# Patient Record
Sex: FEMALE | Race: BLACK OR AFRICAN AMERICAN | Employment: FULL TIME | URBAN - METROPOLITAN AREA
[De-identification: names, ages, dates, MRNs, and addresses within clinical notes are randomized per-mention and may not be internally consistent; named-entity substitution may affect disease eponyms.]

---

## 2017-03-14 DIAGNOSIS — E03.9 ACQUIRED HYPOTHYROIDISM: ICD-10-CM

## 2017-03-14 NOTE — TELEPHONE ENCOUNTER
From: Artemio Reno  To: Linda Douglas NP  Sent: 3/14/2017 3:09 PM EDT  Subject: Medication Renewal Request    Original authorizing provider: MADYSON Ellis would like a refill of the following medications:  levothyroxine (SYNTHROID) 88 mcg tablet Linda Dogulas NP]    Preferred pharmacy: [facility]    Comment:  Good afternoon, I was wanting to know how do I go about getting a refill on my Synthroid, also will I have make an appointment for lab work to be done to make sure I am getting the correct dosage. My cell number is [phone] thank you for your time have a great day.

## 2017-03-15 RX ORDER — LEVOTHYROXINE SODIUM 88 UG/1
88 TABLET ORAL
Qty: 30 TAB | Refills: 1 | Status: SHIPPED | OUTPATIENT
Start: 2017-03-15 | End: 2017-03-27 | Stop reason: DRUGHIGH

## 2017-03-27 ENCOUNTER — OFFICE VISIT (OUTPATIENT)
Dept: FAMILY MEDICINE CLINIC | Age: 32
End: 2017-03-27

## 2017-03-27 ENCOUNTER — HOSPITAL ENCOUNTER (OUTPATIENT)
Dept: LAB | Age: 32
Discharge: HOME | End: 2017-03-27
Payer: COMMERCIAL

## 2017-03-27 VITALS
HEART RATE: 81 BPM | OXYGEN SATURATION: 98 % | DIASTOLIC BLOOD PRESSURE: 82 MMHG | TEMPERATURE: 98.2 F | SYSTOLIC BLOOD PRESSURE: 110 MMHG | HEIGHT: 67 IN | BODY MASS INDEX: 37.86 KG/M2 | WEIGHT: 241.2 LBS | RESPIRATION RATE: 16 BRPM

## 2017-03-27 DIAGNOSIS — E03.9 ACQUIRED HYPOTHYROIDISM: Primary | ICD-10-CM

## 2017-03-27 DIAGNOSIS — E03.9 ACQUIRED HYPOTHYROIDISM: ICD-10-CM

## 2017-03-27 DIAGNOSIS — J01.00 ACUTE NON-RECURRENT MAXILLARY SINUSITIS: ICD-10-CM

## 2017-03-27 DIAGNOSIS — J30.9 ALLERGIC RHINITIS, UNSPECIFIED ALLERGIC RHINITIS TRIGGER, UNSPECIFIED RHINITIS SEASONALITY: ICD-10-CM

## 2017-03-27 LAB
T4 FREE SERPL-MCNC: 0.8 NG/DL (ref 0.7–1.5)
TSH SERPL DL<=0.05 MIU/L-ACNC: 7.44 UIU/ML (ref 0.36–3.74)

## 2017-03-27 PROCEDURE — 84439 ASSAY OF FREE THYROXINE: CPT | Performed by: NURSE PRACTITIONER

## 2017-03-27 PROCEDURE — 84443 ASSAY THYROID STIM HORMONE: CPT | Performed by: NURSE PRACTITIONER

## 2017-03-27 RX ORDER — LEVOTHYROXINE SODIUM 100 UG/1
100 TABLET ORAL
Qty: 30 TAB | Refills: 4 | Status: SHIPPED | OUTPATIENT
Start: 2017-03-27

## 2017-03-27 RX ORDER — LEVOTHYROXINE SODIUM 88 UG/1
88 TABLET ORAL
Qty: 30 TAB | Refills: 1 | Status: CANCELLED | OUTPATIENT
Start: 2017-03-27

## 2017-03-27 RX ORDER — LORATADINE 10 MG/1
10 TABLET ORAL
Qty: 30 TAB | Refills: 3 | Status: SHIPPED | OUTPATIENT
Start: 2017-03-27

## 2017-03-27 RX ORDER — BENZOCAINE .13; .15; .5; 2 G/100G; G/100G; G/100G; G/100G
2 GEL ORAL
Qty: 1 BOTTLE | Refills: 2 | Status: SHIPPED | OUTPATIENT
Start: 2017-03-27

## 2017-03-27 RX ORDER — AZITHROMYCIN 250 MG/1
TABLET, FILM COATED ORAL
Qty: 6 TAB | Refills: 0 | Status: SHIPPED | OUTPATIENT
Start: 2017-03-27 | End: 2017-04-01

## 2017-03-27 NOTE — PROGRESS NOTES
1. Have you been to the ER, urgent care clinic since your last visit? Hospitalized since your last visit? Yes, [facility] 3/23/2017 URI    2. Have you seen or consulted any other health care providers outside of the [address] [place] [address] since your last visit? Include any pap smears or colon screening. No    3. Would you like to have a Flu Shot Today? Already had    4. Vaccines?  tdap    Rx updated

## 2017-03-27 NOTE — PATIENT INSTRUCTIONS
Allergies: Care Instructions  Your Care Instructions  Allergies occur when your body's defense system (immune system) overreacts to certain substances. The immune system treats a harmless substance as if it were a harmful germ or virus. Many things can cause this overreaction, including pollens, medicine, food, dust, animal dander, and mold. Allergies can be mild or severe. Mild allergies can be managed with home treatment. But medicine may be needed to prevent problems. Managing your allergies is an important part of staying healthy. Your doctor may suggest that you have allergy testing to help find out what is causing your allergies. When you know what things trigger your symptoms, you can avoid them. This can prevent allergy symptoms and other health problems. For severe allergies that cause reactions that affect your whole body (anaphylactic reactions), your doctor may prescribe a shot of epinephrine to carry with you in case you have a severe reaction. Learn how to give yourself the shot and keep it with you at all times. Make sure it is not . Follow-up care is a key part of your treatment and safety. Be sure to make and go to all appointments, and call your doctor if you are having problems. It's also a good idea to know your test results and keep a list of the medicines you take. How can you care for yourself at home? · If you have been told by your doctor that dust or dust mites are causing your allergy, decrease the dust around your bed:  [facility] sheets, pillowcases, and other bedding in hot water every week. ¨ Use dust-proof covers for pillows, duvets, and mattresses. Avoid plastic covers because they tear easily and do not \"breathe. \" Wash as instructed on the label. ¨ Do not use any blankets and pillows that you do not need. ¨ Use blankets that you can wash in your washing machine. ¨ Consider removing drapes and carpets, which attract and hold dust, from your bedroom.   · If you are allergic to house dust and mites, do not use home humidifiers. Your doctor can suggest ways you can control dust and mites. · Look for signs of cockroaches. Cockroaches cause allergic reactions. Use cockroach baits to get rid of them. Then, clean your home well. Cockroaches like areas where grocery bags, newspapers, empty bottles, or cardboard boxes are stored. Do not keep these inside your home, and keep trash and food containers sealed. Seal off any spots where cockroaches might enter your home. · If you are allergic to mold, get rid of furniture, rugs, and drapes that smell musty. Check for mold in the bathroom. · If you are allergic to outdoor pollen or mold spores, use air-conditioning. Change or clean all filters every month. Keep windows closed. · If you are allergic to pollen, stay inside when pollen counts are high. Use a vacuum  with a HEPA filter or a double-thickness filter at least two times each week. · Stay inside when air pollution is bad. Avoid paint fumes, perfumes, and other strong odors. · Avoid conditions that make your allergies worse. Stay away from smoke. Do not smoke or let anyone else smoke in your house. Do not use fireplaces or wood-burning stoves. · If you are allergic to your pets, change the air filter in your furnace every month. Use high-efficiency filters. · If you are allergic to pet dander, keep pets outside or out of your bedroom. Old carpet and cloth furniture can hold a lot of animal dander. You may need to replace them. When should you call for help? Give an epinephrine shot if:  · You think you are having a severe allergic reaction. · You have symptoms in more than one body area, such as mild nausea and an itchy mouth. After giving an epinephrine shot call 911, even if you feel better. Call 911 if:  · You have symptoms of a severe allergic reaction. These may include:  ¨ Sudden raised, red areas (hives) all over your body.   ¨ Swelling of the throat, mouth, lips, or tongue. ¨ Trouble breathing. ¨ Passing out (losing consciousness). Or you may feel very lightheaded or suddenly feel weak, confused, or restless. · You have been given an epinephrine shot, even if you feel better. Call your doctor now or seek immediate medical care if:  · You have symptoms of an allergic reaction, such as:  ¨ A rash or hives (raised, red areas on the skin). ¨ Itching. ¨ Swelling. ¨ Belly pain, nausea, or vomiting. Watch closely for changes in your health, and be sure to contact your doctor if:  · You do not get better as expected. Where can you learn more? Go to http://liana-bladimir.info/. Enter A059 in the search box to learn more about \"Allergies: Care Instructions. \"  Current as of: February 12, 2016  Content Version: 11.1  © 7545-7094 [employer]. Care instructions adapted under license by [employer] (which disclaims liability or warranty for this information). If you have questions about a medical condition or this instruction, always ask your healthcare professional. [place] [address] any warranty or liability for your use of this information. Using a Nasal Steroid Spray: Care Instructions  Your Care Instructions    Your doctor may suggest using a corticosteroid nasal spray for your allergy symptoms or sinus problems. These sprays reduce the swelling inside the nose and sinuses. Unlike decongestant nasal sprays, steroid sprays won't lead to more swelling when you stop taking them. These sprays start working in a few days, but it may take several weeks before you get the full effect. Most side effects are minor. The most common complaint is a burning feeling in the nose right after the spray is used. Some people get nosebleeds. Follow-up care is a key part of your treatment and safety. Be sure to make and go to all appointments, and call your doctor if you are having problems.  It's also a good idea to know your test results and keep a list of the medicines you take. How can you care for yourself at home? Here are some tips for using these sprays:  · You may need to prime the sprayer before you use it. This means spraying it into the air a few times to make sure you get the right amount of medicine. Follow the directions on the label. · Blow your nose before you spray. This will help clear out your nostrils. · Gently sniff the medicine into your nose as you spray. Don't snort, or the medicine will go all the way into your throat where it won't do much good. · Aim the nozzle straight toward the back of your nose. This will help keep the medicine from irritating the inside walls of your nostril, especially your septum (the wall that separates your left and right nostrils). · Don't blow your nose for 10 minutes or so after you spray. And try not to sneeze. · Be safe with medicines. Use this medicine exactly as prescribed. Call your doctor if you think you are having a problem with your medicine. · Clean your sprayer once a week. Read the label to learn how. When should you call for help? Call your doctor now or seek immediate medical care if:  · You don't understand how to use the medicine. · Your symptoms aren't getting better as expected. · You think you are having a side effect from the medicine. Where can you learn more? Go to http://liana-bladimir.info/. Enter C102 in the search box to learn more about \"Using a Nasal Steroid Spray: Care Instructions. \"  Current as of: July 29, 2016  Content Version: 11.1  © 2036-8093 [employer]. Care instructions adapted under license by [employer] (which disclaims liability or warranty for this information). If you have questions about a medical condition or this instruction, always ask your healthcare professional. [address] any warranty or liability for your use of this information.        Sinusitis: Care Instructions  Your Care Instructions    Sinusitis is an infection of the lining of the sinus cavities in your head. Sinusitis often follows a cold. It causes pain and pressure in your head and face. In most cases, sinusitis gets better on its own in 1 to 2 weeks. But some mild symptoms may last for several weeks. Sometimes antibiotics are needed. Follow-up care is a key part of your treatment and safety. Be sure to make and go to all appointments, and call your doctor if you are having problems. It's also a good idea to know your test results and keep a list of the medicines you take. How can you care for yourself at home? · Take an over-the-counter pain medicine, such as acetaminophen (Tylenol), ibuprofen (Advil, Motrin), or naproxen (Aleve). Read and follow all instructions on the label. · If the doctor prescribed antibiotics, take them as directed. Do not stop taking them just because you feel better. You need to take the full course of antibiotics. · Be careful when taking over-the-counter cold or flu medicines and Tylenol at the same time. Many of these medicines have acetaminophen, which is Tylenol. Read the labels to make sure that you are not taking more than the recommended dose. Too much acetaminophen (Tylenol) can be harmful. · Breathe warm, moist air from a steamy shower, a hot bath, or a sink filled with hot water. Avoid cold, dry air. Using a humidifier in your home may help. Follow the directions for cleaning the machine. · Use saline (saltwater) nasal washes to help keep your nasal passages open and wash out mucus and bacteria. You can buy saline nose drops at a grocery store or drugstore. Or you can make your own at home by adding 1 teaspoon of salt and 1 teaspoon of baking soda to 2 cups of distilled water. If you make your own, fill a bulb syringe with the solution, insert the tip into your nostril, and squeeze gently. Manjit Sabrina your nose.   · Put a hot, wet towel or a warm gel pack on your face 3 or 4 times a day for 5 to 10 minutes each time. · Try a decongestant nasal spray like oxymetazoline (Afrin). Do not use it for more than 3 days in a row. Using it for more than 3 days can make your congestion worse. When should you call for help? Call your doctor now or seek immediate medical care if:  · You have new or worse swelling or redness in your face or around your eyes. · You have a new or higher fever. Watch closely for changes in your health, and be sure to contact your doctor if:  · You have new or worse facial pain. · The mucus from your nose becomes thicker (like pus) or has new blood in it. · You are not getting better as expected. Where can you learn more? Go to http://liana-bladimir.info/. Enter H158 in the search box to learn more about \"Sinusitis: Care Instructions. \"  Current as of: July 29, 2016  Content Version: 11.1  © 6087-8349 [employer]. Care instructions adapted under license by [employer] (which disclaims liability or warranty for this information). If you have questions about a medical condition or this instruction, always ask your healthcare professional. [place] [address] any warranty or liability for your use of this information. Shingles: Care Instructions  Your Care Instructions    Shingles (herpes zoster) causes pain and a blistered rash. The rash can appear anywhere on the body but will be on only one side of the body, the left or right. It will be in a band, a strip, or a small area. The pain can be very severe. Shingles can also cause tingling or itching in the area of the rash. The blisters scab over after a few days and heal in 2 to 4 weeks. Medicines can help you feel better and may help prevent more serious problems caused by shingles. Shingles is caused by the same virus that causes chickenpox.  When you have chickenpox, the virus gets into your nerve roots and stays there (becomes dormant) long after you get over the chickenpox. If the virus becomes active again, it can cause shingles. Follow-up care is a key part of your treatment and safety. Be sure to make and go to all appointments, and call your doctor if you are having problems. It's also a good idea to know your test results and keep a list of the medicines you take. How can you care for yourself at home? · Be safe with medicines. Take your medicines exactly as prescribed. Call your doctor if you think you are having a problem with your medicine. Antiviral medicine helps you get better faster. · Try not to scratch or pick at the blisters. They will crust over and fall off on their own if you leave them alone. · Put cool, wet cloths on the area to relieve pain and itching. You can also use calamine lotion. Try not to use so much lotion that it cakes and is hard to get off. · Put cornstarch or baking soda on the sores to help dry them out so they heal faster. · Do not use thick ointment, such as petroleum jelly, on the sores. This will keep them from drying and healing. · To help remove loose crusts, soak them in tap water. This can help decrease oozing, and dry and soothe the skin. · Take an over-the-counter pain medicine, such as acetaminophen (Tylenol), ibuprofen (Advil, Motrin), or naproxen (Aleve). Read and follow all instructions on the label. · Avoid close contact with people until the blisters have healed. It is very important for you to avoid contact with anyone who has never had chickenpox or the chickenpox vaccine. Pregnant women, young babies, and anyone else who has a hard time fighting infection (such as someone with HIV, diabetes, or cancer) is especially at risk. When should you call for help? Call your doctor now or seek immediate medical care if:  · You have a new or higher fever. · You have a severe headache and a stiff neck. · You lose the ability to think clearly.   · The rash spreads to your forehead, nose, eyes, or eyelids. · You have eye pain, or your vision gets worse. · You have new pain in your face, or you cannot move the muscles in your face. · Blisters spread to new parts of your body. Watch closely for changes in your health, and be sure to contact your doctor if:  · The rash has not healed after 2 to 4 weeks. · You still have pain after the rash has healed. Where can you learn more? Go to http://liana-bladimir.info/. Rebecca Parra in the search box to learn more about \"Shingles: Care Instructions. \"  Current as of: May 24, 2016  Content Version: 11.1  © 5717-5864 [employer].. Care instructions adapted under license by [employer] (which disclaims liability or warranty for this information). If you have questions about a medical condition or this instruction, always ask your healthcare professional. [address] any warranty or liability for your use of this information.

## 2017-03-27 NOTE — MR AVS SNAPSHOT
Visit Information Date & Time Provider Department Dept. Phone Encounter #  
 3/27/2017  7:00 AM Kalpesh Iverson, [address]  [id number] Follow-up Instructions Return in about 3 months (around 6/27/2017), or if symptoms worsen or fail to improve, for follow up thyroid and BMI. Upcoming Health Maintenance Date Due DTaP/Tdap/Td series (1 - Tdap) 6/8/2006 PAP AKA CERVICAL CYTOLOGY 11/14/2019 Allergies as of 3/27/2017  Review Complete On: 3/27/2017 By: Kalpesh Iverson NP No Known Allergies Current Immunizations  Reviewed on 11/14/2016 Name Date Influenza Vaccine 9/7/2016 Not reviewed this visit You Were Diagnosed With   
  
 Codes Comments Acquired hypothyroidism    -  Primary ICD-10-CM: E03.9 ICD-9-CM: 244.9 Acute non-recurrent maxillary sinusitis     ICD-10-CM: J01.00 ICD-9-CM: 461.0 Allergic rhinitis, unspecified allergic rhinitis trigger, unspecified rhinitis seasonality     ICD-10-CM: J30.9 ICD-9-CM: 477.9 Vitals BP Pulse Temp Resp Height(growth percentile) Weight(growth percentile) 110/82 (BP 1 Location: Left arm, BP Patient Position: Sitting) 81 98.2 °F (36.8 °C) (Oral) 16 5' 7\" (1.702 m) 241 lb 3.2 oz (109.4 kg) LMP SpO2 BMI OB Status Smoking Status 03/25/2017 (Exact Date) 98% 37.78 kg/m2 Having regular periods Never Smoker BMI and BSA Data Body Mass Index Body Surface Area 37.78 kg/m 2 2.27 m 2 Preferred Pharmacy Pharmacy Name Phone [facility] [address], [address] Your Updated Medication List  
  
   
This list is accurate as of: 3/27/17  7:45 AM.  Always use your most recent med list.  
  
  
  
  
 azithromycin 250 mg tablet Commonly known as:  [place] Take 2 tablets today, then take 1 tablet daily  
  
 budesonide 32 mcg/actuation nasal spray Commonly known as:  RHINOCORT AQUA  
2 Sprays by Both Nostrils route daily as needed for Rhinitis or Allergies. ferrous sulfate 325 mg (65 mg iron) EC tablet Commonly known as:  IRON Take 1 Tab by mouth two (2) times a day. ibuprofen 800 mg tablet Commonly known as:  MOTRIN Take 1 Tab by mouth every eight (8) hours as needed for Pain.  
  
 levothyroxine 100 mcg tablet Commonly known as:  SYNTHROID Take 1 Tab by mouth Daily (before breakfast). Indications: hypothyroidism  
  
 loratadine 10 mg tablet Commonly known as:  Robbin Seal Take 1 Tab by mouth daily as needed for Allergies. Prescriptions Sent to Pharmacy Refills  
 levothyroxine (SYNTHROID) 100 mcg tablet 4 Sig: Take 1 Tab by mouth Daily (before breakfast). Indications: hypothyroidism Class: Normal  
 Pharmacy: [address] [place] [address] [address] [place] [address], [address] [place] [address] Ph #: [phone] Route: Oral  
 azithromycin (ZITHROMAX) 250 mg tablet 0 Sig: Take 2 tablets today, then take 1 tablet daily Class: Normal  
 Pharmacy: [facility] [address] [place] [address] Ph #: [phone]  
 budesonide (RHINOCORT AQUA) 32 mcg/actuation nasal spray 2 Si Sprays by Both Nostrils route daily as needed for Rhinitis or Allergies. Class: Normal  
 Pharmacy: [address] [place] [address] [address] [place] [address], [address] [place] [address] Ph #: [phone] Route: Both Nostrils  
 loratadine (CLARITIN) 10 mg tablet 3 Sig: Take 1 Tab by mouth daily as needed for Allergies. Class: Normal  
 Pharmacy: [address] [place] [address]. [address] [place] [address], [address] [place] [address] Ph #: [phone] Route: Oral  
  
Follow-up Instructions Return in about 3 months (around 2017), or if symptoms worsen or fail to improve, for follow up thyroid and BMI. To-Do List   
 2017 Lab:  T4, FREE   
  
 2017   Lab:  TSH 3RD GENERATION   
  
  
 Patient Instructions Allergies: Care Instructions Your Care Instructions Allergies occur when your body's defense system (immune system) overreacts to certain substances. The immune system treats a harmless substance as if it were a harmful germ or virus. Many things can cause this overreaction, including pollens, medicine, food, dust, animal dander, and mold. Allergies can be mild or severe. Mild allergies can be managed with home treatment. But medicine may be needed to prevent problems. Managing your allergies is an important part of staying healthy. Your doctor may suggest that you have allergy testing to help find out what is causing your allergies. When you know what things trigger your symptoms, you can avoid them. This can prevent allergy symptoms and other health problems. For severe allergies that cause reactions that affect your whole body (anaphylactic reactions), your doctor may prescribe a shot of epinephrine to carry with you in case you have a severe reaction. Learn how to give yourself the shot and keep it with you at all times. Make sure it is not . Follow-up care is a key part of your treatment and safety. Be sure to make and go to all appointments, and call your doctor if you are having problems. It's also a good idea to know your test results and keep a list of the medicines you take. How can you care for yourself at home? · If you have been told by your doctor that dust or dust mites are causing your allergy, decrease the dust around your bed: 
[facility] AUTHORITY sheets, pillowcases, and other bedding in hot water every week. ¨ Use dust-proof covers for pillows, duvets, and mattresses. Avoid plastic covers because they tear easily and do not \"breathe. \" Wash as instructed on the label. ¨ Do not use any blankets and pillows that you do not need. ¨ Use blankets that you can wash in your washing machine.  
¨ Consider removing drapes and carpets, which attract and hold dust, from your bedroom. · If you are allergic to house dust and mites, do not use home humidifiers. Your doctor can suggest ways you can control dust and mites. · Look for signs of cockroaches. Cockroaches cause allergic reactions. Use cockroach baits to get rid of them. Then, clean your home well. Cockroaches like areas where grocery bags, newspapers, empty bottles, or cardboard boxes are stored. Do not keep these inside your home, and keep trash and food containers sealed. Seal off any spots where cockroaches might enter your home. · If you are allergic to mold, get rid of furniture, rugs, and drapes that smell musty. Check for mold in the bathroom. · If you are allergic to outdoor pollen or mold spores, use air-conditioning. Change or clean all filters every month. Keep windows closed. · If you are allergic to pollen, stay inside when pollen counts are high. Use a vacuum  with a HEPA filter or a double-thickness filter at least two times each week. · Stay inside when air pollution is bad. Avoid paint fumes, perfumes, and other strong odors. · Avoid conditions that make your allergies worse. Stay away from smoke. Do not smoke or let anyone else smoke in your house. Do not use fireplaces or wood-burning stoves. · If you are allergic to your pets, change the air filter in your furnace every month. Use high-efficiency filters. · If you are allergic to pet dander, keep pets outside or out of your bedroom. Old carpet and cloth furniture can hold a lot of animal dander. You may need to replace them. When should you call for help? Give an epinephrine shot if: 
· You think you are having a severe allergic reaction. · You have symptoms in more than one body area, such as mild nausea and an itchy mouth. After giving an epinephrine shot call 911, even if you feel better. Call 911 if: 
· You have symptoms of a severe allergic reaction. These may include: 
¨ Sudden raised, red areas (hives) all over your body. ¨ Swelling of the throat, mouth, lips, or tongue. ¨ Trouble breathing. ¨ Passing out (losing consciousness). Or you may feel very lightheaded or suddenly feel weak, confused, or restless. · You have been given an epinephrine shot, even if you feel better. Call your doctor now or seek immediate medical care if: 
· You have symptoms of an allergic reaction, such as: ¨ A rash or hives (raised, red areas on the skin). ¨ Itching. ¨ Swelling. ¨ Belly pain, nausea, or vomiting. Watch closely for changes in your health, and be sure to contact your doctor if: 
· You do not get better as expected. Where can you learn more? Go to http://liana-bladimir.info/. Enter M438 in the search box to learn more about \"Allergies: Care Instructions. \" Current as of: February 12, 2016 Content Version: 11.1 © 2006-2016 [employer]. Care instructions adapted under license by [employer] (which disclaims liability or warranty for this information). If you have questions about a medical condition or this instruction, always ask your healthcare professional. [place] [address] any warranty or liability for your use of this information. Using a Nasal Steroid Spray: Care Instructions Your Care Instructions Your doctor may suggest using a corticosteroid nasal spray for your allergy symptoms or sinus problems. These sprays reduce the swelling inside the nose and sinuses. Unlike decongestant nasal sprays, steroid sprays won't lead to more swelling when you stop taking them. These sprays start working in a few days, but it may take several weeks before you get the full effect. Most side effects are minor. The most common complaint is a burning feeling in the nose right after the spray is used. Some people get nosebleeds. Follow-up care is a key part of your treatment and safety.  Be sure to make and go to all appointments, and call your doctor if you are having problems. It's also a good idea to know your test results and keep a list of the medicines you take. How can you care for yourself at home? Here are some tips for using these sprays: 
· You may need to prime the sprayer before you use it. This means spraying it into the air a few times to make sure you get the right amount of medicine. Follow the directions on the label. · Blow your nose before you spray. This will help clear out your nostrils. · Gently sniff the medicine into your nose as you spray. Don't snort, or the medicine will go all the way into your throat where it won't do much good. · Aim the nozzle straight toward the back of your nose. This will help keep the medicine from irritating the inside walls of your nostril, especially your septum (the wall that separates your left and right nostrils). · Don't blow your nose for 10 minutes or so after you spray. And try not to sneeze. · Be safe with medicines. Use this medicine exactly as prescribed. Call your doctor if you think you are having a problem with your medicine. · Clean your sprayer once a week. Read the label to learn how. When should you call for help? Call your doctor now or seek immediate medical care if: 
· You don't understand how to use the medicine. · Your symptoms aren't getting better as expected. · You think you are having a side effect from the medicine. Where can you learn more? Go to http://liana-bladimir.info/. Enter M651 in the search box to learn more about \"Using a Nasal Steroid Spray: Care Instructions. \" Current as of: July 29, 2016 Content Version: 11.1 © 1338-4846 [employer]. Care instructions adapted under license by [employer] (which disclaims liability or warranty for this information).  If you have questions about a medical condition or this instruction, always ask your healthcare professional. [employer] disclaims any warranty or liability for your use of this information. Sinusitis: Care Instructions Your Care Instructions Sinusitis is an infection of the lining of the sinus cavities in your head. Sinusitis often follows a cold. It causes pain and pressure in your head and face. In most cases, sinusitis gets better on its own in 1 to 2 weeks. But some mild symptoms may last for several weeks. Sometimes antibiotics are needed. Follow-up care is a key part of your treatment and safety. Be sure to make and go to all appointments, and call your doctor if you are having problems. It's also a good idea to know your test results and keep a list of the medicines you take. How can you care for yourself at home? · Take an over-the-counter pain medicine, such as acetaminophen (Tylenol), ibuprofen (Advil, Motrin), or naproxen (Aleve). Read and follow all instructions on the label. · If the doctor prescribed antibiotics, take them as directed. Do not stop taking them just because you feel better. You need to take the full course of antibiotics. · Be careful when taking over-the-counter cold or flu medicines and Tylenol at the same time. Many of these medicines have acetaminophen, which is Tylenol. Read the labels to make sure that you are not taking more than the recommended dose. Too much acetaminophen (Tylenol) can be harmful. · Breathe warm, moist air from a steamy shower, a hot bath, or a sink filled with hot water. Avoid cold, dry air. Using a humidifier in your home may help. Follow the directions for cleaning the machine. · Use saline (saltwater) nasal washes to help keep your nasal passages open and wash out mucus and bacteria. You can buy saline nose drops at a grocery store or drugstore. Or you can make your own at home by adding 1 teaspoon of salt and 1 teaspoon of baking soda to 2 cups of distilled water.  If you make your own, fill a bulb syringe with the solution, insert the tip into your nostril, and squeeze gently. Silas Lien your nose. · Put a hot, wet towel or a warm gel pack on your face 3 or 4 times a day for 5 to 10 minutes each time. · Try a decongestant nasal spray like oxymetazoline (Afrin). Do not use it for more than 3 days in a row. Using it for more than 3 days can make your congestion worse. When should you call for help? Call your doctor now or seek immediate medical care if: 
· You have new or worse swelling or redness in your face or around your eyes. · You have a new or higher fever. Watch closely for changes in your health, and be sure to contact your doctor if: 
· You have new or worse facial pain. · The mucus from your nose becomes thicker (like pus) or has new blood in it. · You are not getting better as expected. Where can you learn more? Go to http://liana-bladimir.info/. Enter L691 in the search box to learn more about \"Sinusitis: Care Instructions. \" Current as of: July 29, 2016 Content Version: 11.1 © 3107-5511 [employer]. Care instructions adapted under license by [employer] (which disclaims liability or warranty for this information). If you have questions about a medical condition or this instruction, always ask your healthcare professional. [address] any warranty or liability for your use of this information. Shingles: Care Instructions Your Care Instructions Shingles (herpes zoster) causes pain and a blistered rash. The rash can appear anywhere on the body but will be on only one side of the body, the left or right. It will be in a band, a strip, or a small area. The pain can be very severe. Shingles can also cause tingling or itching in the area of the rash. The blisters scab over after a few days and heal in 2 to 4 weeks. Medicines can help you feel better and may help prevent more serious problems caused by shingles. Shingles is caused by the same virus that causes chickenpox. When you have chickenpox, the virus gets into your nerve roots and stays there (becomes dormant) long after you get over the chickenpox. If the virus becomes active again, it can cause shingles. Follow-up care is a key part of your treatment and safety. Be sure to make and go to all appointments, and call your doctor if you are having problems. It's also a good idea to know your test results and keep a list of the medicines you take. How can you care for yourself at home? · Be safe with medicines. Take your medicines exactly as prescribed. Call your doctor if you think you are having a problem with your medicine. Antiviral medicine helps you get better faster. · Try not to scratch or pick at the blisters. They will crust over and fall off on their own if you leave them alone. · Put cool, wet cloths on the area to relieve pain and itching. You can also use calamine lotion. Try not to use so much lotion that it cakes and is hard to get off. · Put cornstarch or baking soda on the sores to help dry them out so they heal faster. · Do not use thick ointment, such as petroleum jelly, on the sores. This will keep them from drying and healing. · To help remove loose crusts, soak them in tap water. This can help decrease oozing, and dry and soothe the skin. · Take an over-the-counter pain medicine, such as acetaminophen (Tylenol), ibuprofen (Advil, Motrin), or naproxen (Aleve). Read and follow all instructions on the label. · Avoid close contact with people until the blisters have healed. It is very important for you to avoid contact with anyone who has never had chickenpox or the chickenpox vaccine. Pregnant women, young babies, and anyone else who has a hard time fighting infection (such as someone with HIV, diabetes, or cancer) is especially at risk. When should you call for help? Call your doctor now or seek immediate medical care if: · You have a new or higher fever. · You have a severe headache and a stiff neck. · You lose the ability to think clearly. · The rash spreads to your forehead, nose, eyes, or eyelids. · You have eye pain, or your vision gets worse. · You have new pain in your face, or you cannot move the muscles in your face. · Blisters spread to new parts of your body. Watch closely for changes in your health, and be sure to contact your doctor if: · The rash has not healed after 2 to 4 weeks. · You still have pain after the rash has healed. Where can you learn more? Go to http://liana-bladimir.info/. Alexander Griffith in the search box to learn more about \"Shingles: Care Instructions. \" Current as of: May 24, 2016 Content Version: 11.1 © 0081-1571 I Do Now I Don't. Care instructions adapted under license by [employer] (which disclaims liability or warranty for this information). If you have questions about a medical condition or this instruction, always ask your healthcare professional. [address] any warranty or liability for your use of this information. Introducing [facility]! Dear Artem Manual: 
Thank you for requesting a [employer] account. Our records indicate that you already have an active [employer] account. You can access your account anytime at [email or web address] Did you know that you can access your hospital and ER discharge instructions at any time in [employer]? You can also review all of your test results from your hospital stay or ER visit. Additional Information If you have questions, please visit the Frequently Asked Questions section of the [employer] website at [email or web address] Remember, [employer] is NOT to be used for urgent needs. For medical emergencies, dial 911. Now available from your iPhone and Android! Please provide this summary of care documentation to your next provider. Your primary care clinician is listed as [facility]. If you have any questions after today's visit, please call [phone].

## 2017-03-27 NOTE — PROGRESS NOTES
Subjective:   Patti Miller is a 32 y.o. female presents to office today for follow up hypothyroid. Taking medications as prescribed. Has been having difficulty losing weight despite trying to eat healthy nutritious diet with portion control. Reviewed last TSH will adjust dosing today. Patient missed one dose today needs refill. Lab Results   Component Value Date/Time    TSH 5.48 11/14/2016 10:02 AM     New concern: was [facility] for the following: patient was seen for congestion and coughing. She was told not strep and sent home with note for two days. She reports continued coughing, nasal congestion and she has tried mucinex over the counter with stomach upset. Patient is also concerned about possibility of outbreak herpes zoster because of stress of illness. Usual outbreak is right posterior shoulder region and axillary region. Advised to notify office if she experiences another such breakout and she denies rash or pain in the aforementioned area today. ROS:  Denies fever, chills, denies sore throat, denies lightheadedness, +nasal secretions thick and brown, constant post nasal drainage and malodor, +facial pressure type headache mild-moderate this morning per patient    Wt Readings from Last 3 Encounters:   03/27/17 241 lb 3.2 oz (109.4 kg)   11/14/16 246 lb (111.6 kg)   10/19/16 242 lb (109.8 kg)     BP Readings from Last 3 Encounters:   03/27/17 110/82   11/14/16 120/80   10/19/16 130/82     PMH: reviewed medications and allergy lists and medical and family history. OBJECTIVE:  Awake and alert in no acute distress  HEENT: nares patent with erythema, boggy, clear secretions bilaterally. TMs retracted bilaterally, pharynx without erythema, neck supple with shotty lymphadenopathy. Exquisite tenderness over maxillary and  Mild tenderness to palpation over ethmoid sinus regions bilaterally  S1 S2 RRR without ectopy or murmur auscultated.   Lungs clear throughout  Integumentary: no skin rashes normal skin turgor  Visit Vitals    /82 (BP 1 Location: Left arm, BP Patient Position: Sitting)    Pulse 81    Temp 98.2 °F (36.8 °C) (Oral)    Resp 16    Ht 5' 7\" (1.702 m)    Wt 241 lb 3.2 oz (109.4 kg)    SpO2 98%    BMI 37.78 kg/m2     Shar Gustafson was seen today for thyroid problem and follow-up. Diagnoses and all orders for this visit:    Acquired hypothyroidism  -     levothyroxine (SYNTHROID) 100 mcg tablet; Take 1 Tab by mouth Daily (before breakfast). Indications: hypothyroidism  -     TSH 3RD GENERATION; Future  -     T4, FREE; Future    Acute non-recurrent maxillary sinusitis  -     azithromycin (ZITHROMAX) 250 mg tablet; Take 2 tablets today, then take 1 tablet daily    Allergic rhinitis, unspecified allergic rhinitis trigger, unspecified rhinitis seasonality  -     budesonide (RHINOCORT AQUA) 32 mcg/actuation nasal spray; 2 Sprays by Both Nostrils route daily as needed for Rhinitis or Allergies. -     loratadine (CLARITIN) 10 mg tablet; Take 1 Tab by mouth daily as needed for Allergies. Other orders  -     Cancel: levothyroxine (SYNTHROID) 88 mcg tablet; Take 1 Tab by mouth Daily (before breakfast). -     Cancel: TETANUS, DIPHTHERIA TOXOIDS AND ACELLULAR PERTUSSIS VACCINE (TDAP), IN INDIVIDS. >=7, IM      Reviewed risks and benefits and common side effects of new medication  General comfort measures  Patient verbalizes understanding. I have discussed the diagnosis with the patient and the intended plan as seen in the above orders. The patient has received an after-visit summary and questions were answered concerning future plans. I have discussed medication side effects and warnings with the patient as well. Follow-up Disposition:  Return in about 3 months (around 6/27/2017), or if symptoms worsen or fail to improve, for follow up thyroid and BMI.

## 2017-05-01 ENCOUNTER — TELEPHONE (OUTPATIENT)
Dept: FAMILY MEDICINE CLINIC | Age: 32
End: 2017-05-01

## 2017-05-01 NOTE — TELEPHONE ENCOUNTER
Spoke with patient to inform that as per , patient is to continue taking Synthroid medication as directed. Inquired if patient has seen OB/GYN. Patient has upcoming appointment on 05/22/2017. Informed that once patient is under the care of OB/GYN to follow OB/GYN directions for proper care of patient and baby, but to continue taking Synthroid unless otherwise, directed by OB/GYN. Patient verbalized understanding.

## 2017-05-01 NOTE — TELEPHONE ENCOUNTER
Pt is pregnant and she wants to know can she continue the thyroid medication she is on since the dosage has increase and she is pregnant please advise.  She was given the numbers for the OB-GYN

## 2017-06-28 ENCOUNTER — HOSPITAL ENCOUNTER (OUTPATIENT)
Dept: LAB | Age: 32
Discharge: HOME | End: 2017-06-28
Payer: COMMERCIAL

## 2017-06-28 ENCOUNTER — OFFICE VISIT (OUTPATIENT)
Dept: FAMILY MEDICINE CLINIC | Age: 32
End: 2017-06-28

## 2017-06-28 VITALS
BODY MASS INDEX: 38.45 KG/M2 | TEMPERATURE: 98.9 F | OXYGEN SATURATION: 98 % | DIASTOLIC BLOOD PRESSURE: 62 MMHG | SYSTOLIC BLOOD PRESSURE: 126 MMHG | WEIGHT: 245 LBS | RESPIRATION RATE: 16 BRPM | HEART RATE: 87 BPM | HEIGHT: 67 IN

## 2017-06-28 DIAGNOSIS — E03.9 HYPOTHYROIDISM, UNSPECIFIED TYPE: Primary | ICD-10-CM

## 2017-06-28 DIAGNOSIS — E03.9 HYPOTHYROIDISM, UNSPECIFIED TYPE: ICD-10-CM

## 2017-06-28 LAB — TSH SERPL DL<=0.05 MIU/L-ACNC: 3.19 UIU/ML (ref 0.36–3.74)

## 2017-06-28 PROCEDURE — 84443 ASSAY THYROID STIM HORMONE: CPT | Performed by: NURSE PRACTITIONER

## 2017-06-28 PROCEDURE — 36415 COLL VENOUS BLD VENIPUNCTURE: CPT | Performed by: NURSE PRACTITIONER

## 2017-06-28 RX ORDER — CHOLECALCIFEROL (VITAMIN D3) 125 MCG
CAPSULE ORAL
COMMUNITY

## 2017-06-28 NOTE — PROGRESS NOTES
HISTORY OF PRESENT ILLNESS  Sid Garner is a 28 y.o. female. Pt presents today for thyroid issues. She is 14 weeks pregnant and just saw her OB who told her to go back to her primary because her TSH was off. Pt was on 88mcq and in March this was increased to 100mcq    Thyroid Problem   The history is provided by the patient. Review of Systems   Constitutional: Negative. HENT: Negative. Eyes: Negative. Respiratory: Negative. Cardiovascular: Negative. Gastrointestinal: Positive for nausea. Negative for constipation and diarrhea. Genitourinary: Negative. Skin: Negative. Visit Vitals    /62 (BP 1 Location: Right arm, BP Patient Position: Sitting)    Pulse 87    Temp 98.9 °F (37.2 °C) (Oral)    Resp 16    Ht 5' 7\" (1.702 m)    Wt 245 lb (111.1 kg)    LMP 03/25/2017    SpO2 98%    BMI 38.37 kg/m2     Physical Exam   Constitutional: She appears well-developed. No distress. Neck: Normal range of motion. Neck supple. Cardiovascular: Normal rate, regular rhythm and normal heart sounds. No murmur heard. Pulmonary/Chest: Breath sounds normal. No respiratory distress. She has no wheezes. She has no rales. ASSESSMENT and PLAN    ICD-10-CM ICD-9-CM    1. Hypothyroidism, unspecified type E03.9 244.9 TSH 3RD GENERATION       PLAN: we discussed hypothyroidism and pregnancy and that she may need to be monitored more frequently during pregnancy. We reviewed TSH levels, how to take medications. Pt understands and has always taken her medicines without food in the morning and waits 1 hour before eating after taking her medication. Pt is to RTC for lab visit in 8 wks. Pt given after visit summary.

## 2017-06-28 NOTE — PROGRESS NOTES
1. Have you been to the ER, urgent care clinic since your last visit? Hospitalized since your last visit? No    2. Have you seen or consulted any other health care providers outside of the [address] [place] [address] since your last visit? Include any pap smears or colon screening.  Yes OBGYN

## 2017-06-28 NOTE — MR AVS SNAPSHOT
Visit Information Date & Time Provider Department Dept. Phone Encounter #  
 6/28/2017  2:00 PM Geena Figueroa  [facility] [phone] [id number] Follow-up Instructions Return in about 8 weeks (around 8/23/2017). Upcoming Health Maintenance Date Due DTaP/Tdap/Td series (1 - Tdap) 6/8/2006 INFLUENZA AGE 9 TO ADULT 8/1/2017 PAP AKA CERVICAL CYTOLOGY 11/14/2019 Allergies as of 6/28/2017  Review Complete On: 6/28/2017 By: Geena Figueroa NP No Known Allergies Current Immunizations  Reviewed on 11/14/2016 Name Date Influenza Vaccine 9/7/2016 Not reviewed this visit You Were Diagnosed With   
  
 Codes Comments Hypothyroidism, unspecified type    -  Primary ICD-10-CM: E03.9 ICD-9-CM: 401. 9 Vitals BP Pulse Temp Resp Height(growth percentile) Weight(growth percentile) 126/62 (BP 1 Location: Right arm, BP Patient Position: Sitting) 87 98.9 °F (37.2 °C) (Oral) 16 5' 7\" (1.702 m) 245 lb (111.1 kg) LMP SpO2 BMI OB Status Smoking Status 03/25/2017 98% 38.37 kg/m2 Having regular periods Never Smoker Vitals History BMI and BSA Data Body Mass Index Body Surface Area  
 38.37 kg/m 2 2.29 m 2 Preferred Pharmacy Pharmacy Name Phone [facility] [address] [place] [address] 32 Your Updated Medication List  
  
   
This list is accurate as of: 6/28/17  2:36 PM.  Always use your most recent med list.  
  
  
  
  
 budesonide 32 mcg/actuation nasal spray Commonly known as:  RHINOCORT AQUA  
2 Sprays by Both Nostrils route daily as needed for Rhinitis or Allergies. ferrous sulfate 325 mg (65 mg iron) EC tablet Commonly known as:  IRON Take 1 Tab by mouth two (2) times a day. ibuprofen 800 mg tablet Commonly known as:  MOTRIN Take 1 Tab by mouth every eight (8) hours as needed for Pain.  
  
 levothyroxine 100 mcg tablet Commonly known as:  SYNTHROID Take 1 Tab by mouth Daily (before breakfast). Indications: hypothyroidism  
  
 loratadine 10 mg tablet Commonly known as:  Miguel Angel High Take 1 Tab by mouth daily as needed for Allergies. VITAMIN D3 2,000 unit Tab Generic drug:  cholecalciferol (vitamin D3) Take  by mouth. Follow-up Instructions Return in about 8 weeks (around 8/23/2017). To-Do List   
 06/28/2017 Lab:  TSH 3RD GENERATION Introducing [facility]! Dear Darcy Barrera: 
Thank you for requesting a [employer] account. Our records indicate that you already have an active [employer] account. You can access your account anytime at [email or web address] Did you know that you can access your hospital and ER discharge instructions at any time in [employer]? You can also review all of your test results from your hospital stay or ER visit. Additional Information If you have questions, please visit the Frequently Asked Questions section of the [employer] website at [email or web address] Remember, [employer] is NOT to be used for urgent needs. For medical emergencies, dial 911. Now available from your iPhone and Android! Please provide this summary of care documentation to your next provider. Your primary care clinician is listed as [address]. If you have any questions after today's visit, please call [phone].

## 2017-06-29 DIAGNOSIS — E03.9 ACQUIRED HYPOTHYROIDISM: Primary | ICD-10-CM

## 2017-06-29 NOTE — PROGRESS NOTES
Please advise Pt that her TSH is in normal limits. She is to continue her current dose. This should be rechecked in 6-8 wks. She only needs a lab visit.

## 2019-03-25 ENCOUNTER — HOSPITAL ENCOUNTER (OUTPATIENT)
Dept: ULTRASOUND IMAGING | Age: 34
Discharge: HOME | End: 2019-03-25
Attending: INTERNAL MEDICINE
Payer: COMMERCIAL

## 2019-03-25 DIAGNOSIS — E04.1 THYROID NODULE: ICD-10-CM

## 2019-03-25 PROCEDURE — 76536 US EXAM OF HEAD AND NECK: CPT

## 2024-03-19 ENCOUNTER — HOSPITAL ENCOUNTER (OUTPATIENT)
Facility: HOSPITAL | Age: 39
Discharge: HOME | End: 2024-03-22

## 2024-03-19 ENCOUNTER — OFFICE VISIT (OUTPATIENT)
Facility: CLINIC | Age: 39
End: 2024-03-19
Payer: COMMERCIAL

## 2024-03-19 VITALS
OXYGEN SATURATION: 96 % | DIASTOLIC BLOOD PRESSURE: 83 MMHG | HEART RATE: 88 BPM | SYSTOLIC BLOOD PRESSURE: 128 MMHG | WEIGHT: 265 LBS | HEIGHT: 66 IN | TEMPERATURE: 97.6 F | RESPIRATION RATE: 18 BRPM | BODY MASS INDEX: 42.59 KG/M2

## 2024-03-19 DIAGNOSIS — E55.9 VITAMIN D DEFICIENCY: ICD-10-CM

## 2024-03-19 DIAGNOSIS — Z13.1 SCREENING FOR DIABETES MELLITUS (DM): ICD-10-CM

## 2024-03-19 DIAGNOSIS — D50.8 OTHER IRON DEFICIENCY ANEMIA: ICD-10-CM

## 2024-03-19 DIAGNOSIS — Z13.220 NEED FOR LIPID SCREENING: ICD-10-CM

## 2024-03-19 DIAGNOSIS — G47.00 INSOMNIA, UNSPECIFIED TYPE: ICD-10-CM

## 2024-03-19 DIAGNOSIS — Z11.4 SCREENING FOR HUMAN IMMUNODEFICIENCY VIRUS: ICD-10-CM

## 2024-03-19 DIAGNOSIS — R73.03 PREDIABETES: ICD-10-CM

## 2024-03-19 DIAGNOSIS — Z13.29 THYROID DISORDER SCREENING: ICD-10-CM

## 2024-03-19 DIAGNOSIS — Z11.59 ENCOUNTER FOR HCV SCREENING TEST FOR LOW RISK PATIENT: ICD-10-CM

## 2024-03-19 DIAGNOSIS — F41.9 ANXIETY: ICD-10-CM

## 2024-03-19 DIAGNOSIS — E03.9 HYPOTHYROIDISM, UNSPECIFIED TYPE: ICD-10-CM

## 2024-03-19 DIAGNOSIS — E03.8 OTHER SPECIFIED HYPOTHYROIDISM: ICD-10-CM

## 2024-03-19 DIAGNOSIS — Z11.59 ENCOUNTER FOR HCV SCREENING TEST FOR LOW RISK PATIENT: Primary | ICD-10-CM

## 2024-03-19 DIAGNOSIS — S86.912D STRAIN OF BOTH KNEES, SUBSEQUENT ENCOUNTER: ICD-10-CM

## 2024-03-19 DIAGNOSIS — J30.1 SEASONAL ALLERGIC RHINITIS DUE TO POLLEN: ICD-10-CM

## 2024-03-19 DIAGNOSIS — S86.911D STRAIN OF BOTH KNEES, SUBSEQUENT ENCOUNTER: ICD-10-CM

## 2024-03-19 LAB — LABCORP SPECIMEN COLLECTION: NORMAL

## 2024-03-19 PROCEDURE — 99204 OFFICE O/P NEW MOD 45 MIN: CPT | Performed by: FAMILY MEDICINE

## 2024-03-19 PROCEDURE — 99001 SPECIMEN HANDLING PT-LAB: CPT

## 2024-03-19 RX ORDER — LEVOTHYROXINE SODIUM 0.12 MG/1
TABLET ORAL
Qty: 90 TABLET | Refills: 1 | Status: SHIPPED | OUTPATIENT
Start: 2024-03-19

## 2024-03-19 RX ORDER — BUSPIRONE HYDROCHLORIDE 5 MG/1
5 TABLET ORAL 2 TIMES DAILY
Qty: 60 TABLET | Refills: 0 | Status: SHIPPED | OUTPATIENT
Start: 2024-03-19 | End: 2024-04-18

## 2024-03-19 RX ORDER — ERGOCALCIFEROL 1.25 MG/1
CAPSULE ORAL
COMMUNITY
Start: 2023-01-08

## 2024-03-19 RX ORDER — METFORMIN HYDROCHLORIDE 500 MG/1
TABLET, EXTENDED RELEASE ORAL
COMMUNITY
End: 2024-03-19 | Stop reason: SDUPTHER

## 2024-03-19 RX ORDER — TRAZODONE HYDROCHLORIDE 50 MG/1
TABLET ORAL
Qty: 90 TABLET | Refills: 1 | Status: SHIPPED | OUTPATIENT
Start: 2024-03-19

## 2024-03-19 RX ORDER — BUPROPION HYDROCHLORIDE 150 MG/1
TABLET ORAL
COMMUNITY
End: 2024-03-19

## 2024-03-19 RX ORDER — LEVOTHYROXINE SODIUM 0.12 MG/1
TABLET ORAL
COMMUNITY
Start: 2024-03-08 | End: 2024-03-19 | Stop reason: SDUPTHER

## 2024-03-19 RX ORDER — METFORMIN HYDROCHLORIDE 500 MG/1
TABLET, EXTENDED RELEASE ORAL
Qty: 90 TABLET | Refills: 1 | Status: SHIPPED | OUTPATIENT
Start: 2024-03-19

## 2024-03-19 RX ORDER — TRAZODONE HYDROCHLORIDE 50 MG/1
TABLET ORAL
COMMUNITY
End: 2024-03-19 | Stop reason: SDUPTHER

## 2024-03-19 RX ORDER — PRENATAL VIT/IRON FUM/FOLIC AC 27MG-0.8MG
TABLET ORAL
COMMUNITY
Start: 2022-12-08

## 2024-03-19 RX ORDER — BUPROPION HYDROCHLORIDE 150 MG/1
150 TABLET ORAL EVERY MORNING
Qty: 90 TABLET | Refills: 1 | Status: SHIPPED | OUTPATIENT
Start: 2024-03-19

## 2024-03-19 ASSESSMENT — ANXIETY QUESTIONNAIRES
3. WORRYING TOO MUCH ABOUT DIFFERENT THINGS: MORE THAN HALF THE DAYS
IF YOU CHECKED OFF ANY PROBLEMS ON THIS QUESTIONNAIRE, HOW DIFFICULT HAVE THESE PROBLEMS MADE IT FOR YOU TO DO YOUR WORK, TAKE CARE OF THINGS AT HOME, OR GET ALONG WITH OTHER PEOPLE: SOMEWHAT DIFFICULT
GAD7 TOTAL SCORE: 7
6. BECOMING EASILY ANNOYED OR IRRITABLE: NOT AT ALL
5. BEING SO RESTLESS THAT IT IS HARD TO SIT STILL: NOT AT ALL
4. TROUBLE RELAXING: MORE THAN HALF THE DAYS
1. FEELING NERVOUS, ANXIOUS, OR ON EDGE: SEVERAL DAYS
7. FEELING AFRAID AS IF SOMETHING AWFUL MIGHT HAPPEN: SEVERAL DAYS
2. NOT BEING ABLE TO STOP OR CONTROL WORRYING: SEVERAL DAYS

## 2024-03-19 ASSESSMENT — PATIENT HEALTH QUESTIONNAIRE - PHQ9
SUM OF ALL RESPONSES TO PHQ QUESTIONS 1-9: 2
1. LITTLE INTEREST OR PLEASURE IN DOING THINGS: SEVERAL DAYS
SUM OF ALL RESPONSES TO PHQ QUESTIONS 1-9: 2
2. FEELING DOWN, DEPRESSED OR HOPELESS: SEVERAL DAYS
SUM OF ALL RESPONSES TO PHQ9 QUESTIONS 1 & 2: 2

## 2024-03-19 NOTE — PROGRESS NOTES
Guillermina Mohan is a 38 y.o. female who presents to the office today for the following:  Chief Complaint   Patient presents with    New Patient       No Known Allergies      Current Outpatient Medications:     Iron, Ferrous Sulfate, 325 (65 Fe) MG TABS, Take 325 mg by mouth 2 times daily, Disp: , Rfl:     vitamin D (ERGOCALCIFEROL) 1.25 MG (52740 UT) CAPS capsule, , Disp: , Rfl:     Prenatal Vit-Fe Fumarate-FA (PRENATAL VITAMIN) 27-0.8 MG TABS, , Disp: , Rfl:     levothyroxine (SYNTHROID) 125 MCG tablet, TAKE 1 TABLET BY MOUTH ONCE DAILY IN THE MORNING ON AN EMPTY STOMACH FOR 90 DAYS, Disp: 90 tablet, Rfl: 1    metFORMIN (GLUCOPHAGE-XR) 500 MG extended release tablet, TAKE 1 TABLET BY MOUTH ONCE DAILY, Disp: 90 tablet, Rfl: 1    traZODone (DESYREL) 50 MG tablet, TAKE 1 TABLET BY MOUTH AS NEEDED IN THE EVENING, Disp: 90 tablet, Rfl: 1    buPROPion (WELLBUTRIN XL) 150 MG extended release tablet, Take 1 tablet by mouth every morning, Disp: 90 tablet, Rfl: 1    busPIRone (BUSPAR) 5 MG tablet, Take 1 tablet by mouth 2 times daily, Disp: 60 tablet, Rfl: 0      Past Medical History:   Diagnosis Date    [place]     Anxiety     Hypothyroidism     Insomnia        Past Surgical History:   Procedure Laterality Date    APPENDECTOMY         Social History     Socioeconomic History    Marital status:      Spouse name: Not on file    Number of children: Not on file    Years of education: Not on file    Highest education level: Not on file   Occupational History    Not on file   Tobacco Use    Smoking status: Never    Smokeless tobacco: Never   Vaping Use    Vaping Use: Never used   Substance and Sexual Activity    Alcohol use: Never    Drug use: Never    Sexual activity: Not on file   Other Topics Concern    Not on file   Social History Narrative    Not on file     Social Determinants of Health     Financial Resource Strain: Not on file   Food Insecurity: Not on file   Transportation Needs: Not on file   Physical

## 2024-03-19 NOTE — PROGRESS NOTES
\"Have you been to the ER, urgent care clinic since your last visit?  Hospitalized since your last visit?\"    NO    “Have you seen or consulted any other health care providers outside of [facility] since your last visit?”    NO     “Have you had a pap smear?”    YES - Where: 12/2022 Nurse/CMA to request most recent records if not in the chart    Date of last Cervical Cancer screen (HPV or PAP): 11/14/2016             Click Here for Release of Records Request

## 2024-03-20 LAB
25(OH)D3+25(OH)D2 SERPL-MCNC: 54.4 NG/ML (ref 30–100)
ALBUMIN SERPL-MCNC: 4.5 G/DL (ref 3.9–4.9)
ALBUMIN/GLOB SERPL: 1.6 {RATIO} (ref 1.2–2.2)
ALP SERPL-CCNC: 54 IU/L (ref 44–121)
ALT SERPL-CCNC: 12 IU/L (ref 0–32)
AST SERPL-CCNC: 13 IU/L (ref 0–40)
BASOPHILS # BLD AUTO: 0 X10E3/UL (ref 0–0.2)
BASOPHILS NFR BLD AUTO: 1 %
BILIRUB SERPL-MCNC: 0.3 MG/DL (ref 0–1.2)
BUN SERPL-MCNC: 9 MG/DL (ref 6–20)
BUN/CREAT SERPL: 15 (ref 9–23)
CALCIUM SERPL-MCNC: 9 MG/DL (ref 8.7–10.2)
CHLORIDE SERPL-SCNC: 102 MMOL/L (ref 96–106)
CHOLEST SERPL-MCNC: 213 MG/DL (ref 100–199)
CO2 SERPL-SCNC: 21 MMOL/L (ref 20–29)
CREAT SERPL-MCNC: 0.62 MG/DL (ref 0.57–1)
EGFRCR SERPLBLD CKD-EPI 2021: 117 ML/MIN/1.73
EOSINOPHIL # BLD AUTO: 0.1 X10E3/UL (ref 0–0.4)
EOSINOPHIL NFR BLD AUTO: 2 %
ERYTHROCYTE [DISTWIDTH] IN BLOOD BY AUTOMATED COUNT: 15.7 % (ref 11.7–15.4)
GLOBULIN SER CALC-MCNC: 2.9 G/DL (ref 1.5–4.5)
GLUCOSE SERPL-MCNC: 85 MG/DL (ref 70–99)
HCT VFR BLD AUTO: 34.6 % (ref 34–46.6)
HDLC SERPL-MCNC: 61 MG/DL
HGB BLD-MCNC: 10.9 G/DL (ref 11.1–15.9)
HIV 1+2 AB+HIV1 P24 AG SERPL QL IA: NON REACTIVE
IMM GRANULOCYTES # BLD AUTO: 0 X10E3/UL (ref 0–0.1)
IMM GRANULOCYTES NFR BLD AUTO: 0 %
IRON SATN MFR SERPL: 11 % (ref 15–55)
IRON SERPL-MCNC: 37 UG/DL (ref 27–159)
LDLC SERPL CALC-MCNC: 134 MG/DL (ref 0–99)
LYMPHOCYTES # BLD AUTO: 2.2 X10E3/UL (ref 0.7–3.1)
LYMPHOCYTES NFR BLD AUTO: 35 %
MCH RBC QN AUTO: 25.8 PG (ref 26.6–33)
MCHC RBC AUTO-ENTMCNC: 31.5 G/DL (ref 31.5–35.7)
MCV RBC AUTO: 82 FL (ref 79–97)
MONOCYTES # BLD AUTO: 0.3 X10E3/UL (ref 0.1–0.9)
MONOCYTES NFR BLD AUTO: 5 %
NEUTROPHILS # BLD AUTO: 3.5 X10E3/UL (ref 1.4–7)
NEUTROPHILS NFR BLD AUTO: 57 %
PLATELET # BLD AUTO: 384 X10E3/UL (ref 150–450)
POTASSIUM SERPL-SCNC: 4.4 MMOL/L (ref 3.5–5.2)
PROT SERPL-MCNC: 7.4 G/DL (ref 6–8.5)
RBC # BLD AUTO: 4.23 X10E6/UL (ref 3.77–5.28)
SODIUM SERPL-SCNC: 139 MMOL/L (ref 134–144)
SPECIMEN STATUS REPORT: NORMAL
TIBC SERPL-MCNC: 344 UG/DL (ref 250–450)
TRIGL SERPL-MCNC: 102 MG/DL (ref 0–149)
TSH SERPL DL<=0.005 MIU/L-ACNC: 2.65 UIU/ML (ref 0.45–4.5)
UIBC SERPL-MCNC: 307 UG/DL (ref 131–425)
VLDLC SERPL CALC-MCNC: 18 MG/DL (ref 5–40)
WBC # BLD AUTO: 6.1 X10E3/UL (ref 3.4–10.8)

## 2024-03-20 RX ORDER — LORATADINE 10 MG/1
10 TABLET ORAL DAILY
Qty: 90 TABLET | Refills: 1 | Status: SHIPPED | OUTPATIENT
Start: 2024-03-20

## 2024-04-18 SDOH — ECONOMIC STABILITY: FOOD INSECURITY: WITHIN THE PAST 12 MONTHS, YOU WORRIED THAT YOUR FOOD WOULD RUN OUT BEFORE YOU GOT MONEY TO BUY MORE.: NEVER TRUE

## 2024-04-18 SDOH — ECONOMIC STABILITY: HOUSING INSECURITY
IN THE LAST 12 MONTHS, WAS THERE A TIME WHEN YOU DID NOT HAVE A STEADY PLACE TO SLEEP OR SLEPT IN A SHELTER (INCLUDING NOW)?: NO

## 2024-04-18 SDOH — ECONOMIC STABILITY: TRANSPORTATION INSECURITY
IN THE PAST 12 MONTHS, HAS LACK OF TRANSPORTATION KEPT YOU FROM MEETINGS, WORK, OR FROM GETTING THINGS NEEDED FOR DAILY LIVING?: NO

## 2024-04-18 SDOH — ECONOMIC STABILITY: FOOD INSECURITY: WITHIN THE PAST 12 MONTHS, THE FOOD YOU BOUGHT JUST DIDN'T LAST AND YOU DIDN'T HAVE MONEY TO GET MORE.: NEVER TRUE

## 2024-04-18 SDOH — ECONOMIC STABILITY: INCOME INSECURITY: HOW HARD IS IT FOR YOU TO PAY FOR THE VERY BASICS LIKE FOOD, HOUSING, MEDICAL CARE, AND HEATING?: NOT HARD AT ALL

## 2024-04-19 ENCOUNTER — OFFICE VISIT (OUTPATIENT)
Facility: CLINIC | Age: 39
End: 2024-04-19
Payer: COMMERCIAL

## 2024-04-19 VITALS
BODY MASS INDEX: 42.27 KG/M2 | RESPIRATION RATE: 18 BRPM | TEMPERATURE: 97.8 F | HEART RATE: 89 BPM | WEIGHT: 263 LBS | HEIGHT: 66 IN | OXYGEN SATURATION: 99 % | DIASTOLIC BLOOD PRESSURE: 85 MMHG | SYSTOLIC BLOOD PRESSURE: 138 MMHG

## 2024-04-19 DIAGNOSIS — F32.0 CURRENT MILD EPISODE OF MAJOR DEPRESSIVE DISORDER WITHOUT PRIOR EPISODE (HCC): Primary | ICD-10-CM

## 2024-04-19 PROCEDURE — 99214 OFFICE O/P EST MOD 30 MIN: CPT | Performed by: FAMILY MEDICINE

## 2024-04-19 RX ORDER — TRAZODONE HYDROCHLORIDE 100 MG/1
100 TABLET ORAL NIGHTLY PRN
Qty: 30 TABLET | Refills: 5 | Status: SHIPPED | OUTPATIENT
Start: 2024-04-19

## 2024-04-19 ASSESSMENT — PATIENT HEALTH QUESTIONNAIRE - PHQ9
SUM OF ALL RESPONSES TO PHQ QUESTIONS 1-9: 2
SUM OF ALL RESPONSES TO PHQ QUESTIONS 1-9: 2
1. LITTLE INTEREST OR PLEASURE IN DOING THINGS: SEVERAL DAYS
SUM OF ALL RESPONSES TO PHQ9 QUESTIONS 1 & 2: 2
SUM OF ALL RESPONSES TO PHQ QUESTIONS 1-9: 2
2. FEELING DOWN, DEPRESSED OR HOPELESS: SEVERAL DAYS
SUM OF ALL RESPONSES TO PHQ QUESTIONS 1-9: 2

## 2024-05-20 DIAGNOSIS — F41.9 ANXIETY: ICD-10-CM

## 2024-05-23 NOTE — TELEPHONE ENCOUNTER
Last seen 4/19/2024   Last labs   Last filled  03/19/2024  Next appointment 10/21/2024     Lab Results   Component Value Date     03/19/2024    K 4.4 03/19/2024     03/19/2024    CO2 21 03/19/2024    BUN 9 03/19/2024    CREATININE 0.62 03/19/2024    GLUCOSE 85 03/19/2024    CALCIUM 9.0 03/19/2024    BILITOT 0.3 03/19/2024    ALKPHOS 54 03/19/2024    AST 13 03/19/2024    ALT 12 03/19/2024    LABGLOM 117 03/19/2024    AGRATIO 1.6 03/19/2024

## 2024-05-29 RX ORDER — BUSPIRONE HYDROCHLORIDE 5 MG/1
5 TABLET ORAL 2 TIMES DAILY
Qty: 60 TABLET | Refills: 0 | OUTPATIENT
Start: 2024-05-29 | End: 2024-06-28

## 2024-05-30 ENCOUNTER — OFFICE VISIT (OUTPATIENT)
Facility: CLINIC | Age: 39
End: 2024-05-30
Payer: COMMERCIAL

## 2024-05-30 VITALS
TEMPERATURE: 97.6 F | WEIGHT: 260.6 LBS | SYSTOLIC BLOOD PRESSURE: 130 MMHG | OXYGEN SATURATION: 99 % | BODY MASS INDEX: 41.88 KG/M2 | HEIGHT: 66 IN | HEART RATE: 87 BPM | RESPIRATION RATE: 16 BRPM | DIASTOLIC BLOOD PRESSURE: 83 MMHG

## 2024-05-30 DIAGNOSIS — F41.9 ANXIETY: Primary | ICD-10-CM

## 2024-05-30 PROCEDURE — 99214 OFFICE O/P EST MOD 30 MIN: CPT | Performed by: FAMILY MEDICINE

## 2024-05-30 RX ORDER — BUPROPION HYDROCHLORIDE 150 MG/1
150 TABLET ORAL EVERY MORNING
Qty: 90 TABLET | Refills: 1 | Status: SHIPPED | OUTPATIENT
Start: 2024-05-30

## 2024-05-30 SDOH — ECONOMIC STABILITY: FOOD INSECURITY: WITHIN THE PAST 12 MONTHS, THE FOOD YOU BOUGHT JUST DIDN'T LAST AND YOU DIDN'T HAVE MONEY TO GET MORE.: NEVER TRUE

## 2024-05-30 SDOH — ECONOMIC STABILITY: FOOD INSECURITY: WITHIN THE PAST 12 MONTHS, YOU WORRIED THAT YOUR FOOD WOULD RUN OUT BEFORE YOU GOT MONEY TO BUY MORE.: NEVER TRUE

## 2024-05-30 SDOH — ECONOMIC STABILITY: INCOME INSECURITY: HOW HARD IS IT FOR YOU TO PAY FOR THE VERY BASICS LIKE FOOD, HOUSING, MEDICAL CARE, AND HEATING?: NOT HARD AT ALL

## 2024-05-30 ASSESSMENT — PATIENT HEALTH QUESTIONNAIRE - PHQ9
SUM OF ALL RESPONSES TO PHQ9 QUESTIONS 1 & 2: 0
SUM OF ALL RESPONSES TO PHQ QUESTIONS 1-9: 0
SUM OF ALL RESPONSES TO PHQ QUESTIONS 1-9: 0
2. FEELING DOWN, DEPRESSED OR HOPELESS: NOT AT ALL
SUM OF ALL RESPONSES TO PHQ QUESTIONS 1-9: 0
1. LITTLE INTEREST OR PLEASURE IN DOING THINGS: NOT AT ALL
SUM OF ALL RESPONSES TO PHQ QUESTIONS 1-9: 0

## 2024-05-30 NOTE — PROGRESS NOTES
\"Have you been to the ER, urgent care clinic since your last visit?  Hospitalized since your last visit?\"    NO    “Have you seen or consulted any other health care providers outside of [facility] since your last visit?”    NO     “Have you had a pap smear?”    NO    Date of last Cervical Cancer screen (HPV or PAP): 11/14/2016             Click Here for Release of Records Request    
Food in the Last Year: Never true     Ran Out of Food in the Last Year: Never true   Transportation Needs: Unknown (5/30/2024)    PRAPARE - Transportation     Lack of Transportation (Medical): Not on file     Lack of Transportation (Non-Medical): No   Physical Activity: Not on file   Stress: Not on file   Social Connections: Not on file   Intimate Partner Violence: Not on file   Housing Stability: Unknown (5/30/2024)    Housing Stability Vital Sign     Unable to Pay for Housing in the Last Year: Not on file     Number of Places Lived in the Last Year: Not on file     Unstable Housing in the Last Year: No     or  Social History     Tobacco Use   Smoking Status Never   Smokeless Tobacco Never       Family History   Problem Relation Age of Onset    High Blood Pressure Mother     Thyroid Cancer Maternal Aunt     High Cholesterol Maternal Grandmother          HPI:  HPI   Wellbutrin does not cause rash and helps with anxiety.  Insomnia- stopped caffeine, trazodone doesn't help if takes daily.  Cannot stay awake.     ROS:  Review of Systems  Anxiety insomnia    Physical Exam:  /83   Pulse 87   Temp 97.6 °F (36.4 °C) (Temporal)   Resp 16   Ht 1.676 m (5' 6\")   Wt 118.2 kg (260 lb 9.6 oz)   SpO2 99%   BMI 42.06 kg/m²   Physical Exam  Vitals reviewed.   Constitutional:       Appearance: She is obese.   Neurological:      Mental Status: She is alert.   Psychiatric:         Mood and Affect: Mood normal.         Behavior: Behavior normal.         Thought Content: Thought content normal.         Judgment: Judgment normal.        Assessment/Plan:      or  1. Anxiety  Stable well-controlled with Wellbutrin.    - buPROPion (WELLBUTRIN XL) 150 MG extended release tablet; Take 1 tablet by mouth every morning  Dispense: 90 tablet; Refill: 1    #2.  Insomnia  Sleep hygiene information.  Trial of melatonin.    Recommend  Sleep medications.  Keep track of any of nighttime awakenings and keep a sleep journal.  Follow-up in 1

## 2024-05-31 DIAGNOSIS — F41.9 ANXIETY: ICD-10-CM

## 2024-06-04 RX ORDER — BUSPIRONE HYDROCHLORIDE 5 MG/1
5 TABLET ORAL 2 TIMES DAILY
Qty: 60 TABLET | Refills: 0 | OUTPATIENT
Start: 2024-06-04

## 2024-06-05 ENCOUNTER — TELEPHONE (OUTPATIENT)
Facility: CLINIC | Age: 39
End: 2024-06-05

## 2024-06-05 DIAGNOSIS — F41.9 ANXIETY: ICD-10-CM

## 2024-06-05 RX ORDER — BUSPIRONE HYDROCHLORIDE 5 MG/1
5 TABLET ORAL 2 TIMES DAILY
Qty: 60 TABLET | Refills: 0 | OUTPATIENT
Start: 2024-06-05

## 2024-06-05 NOTE — TELEPHONE ENCOUNTER
Patient states she called the pharmacy and they do not have the medication,  buPROPion (WELLBUTRIN XL) 150 MG, that was sent on 05/30/2024. Called the pharmacy myself to confirm they do not have any prescriptions for the medication. Pharmacy states they do have it and the patient can fill it for $25.   There is still trace of pulmonary hypertension on the echocardiogram but nothing that would warrant intervention unless she has symptoms. If she is short of breath probably I will consider right heart catheterization. If she doesn't have major shortness of breath I will repeat the echo 1 year.

## 2024-06-06 DIAGNOSIS — F41.9 ANXIETY: Primary | ICD-10-CM

## 2024-06-06 RX ORDER — BUSPIRONE HYDROCHLORIDE 5 MG/1
5 TABLET ORAL 2 TIMES DAILY
Qty: 180 TABLET | Refills: 1 | Status: SHIPPED | OUTPATIENT
Start: 2024-06-06 | End: 2024-12-03

## 2024-06-20 ENCOUNTER — TELEMEDICINE (OUTPATIENT)
Facility: CLINIC | Age: 39
End: 2024-06-20
Payer: COMMERCIAL

## 2024-06-20 DIAGNOSIS — F41.9 ANXIETY: Primary | ICD-10-CM

## 2024-06-20 PROCEDURE — 99213 OFFICE O/P EST LOW 20 MIN: CPT | Performed by: FAMILY MEDICINE

## 2024-06-20 SDOH — ECONOMIC STABILITY: FOOD INSECURITY: WITHIN THE PAST 12 MONTHS, THE FOOD YOU BOUGHT JUST DIDN'T LAST AND YOU DIDN'T HAVE MONEY TO GET MORE.: NEVER TRUE

## 2024-06-20 SDOH — ECONOMIC STABILITY: FOOD INSECURITY: WITHIN THE PAST 12 MONTHS, YOU WORRIED THAT YOUR FOOD WOULD RUN OUT BEFORE YOU GOT MONEY TO BUY MORE.: NEVER TRUE

## 2024-06-20 SDOH — ECONOMIC STABILITY: INCOME INSECURITY: HOW HARD IS IT FOR YOU TO PAY FOR THE VERY BASICS LIKE FOOD, HOUSING, MEDICAL CARE, AND HEATING?: NOT HARD AT ALL

## 2024-06-20 ASSESSMENT — PATIENT HEALTH QUESTIONNAIRE - PHQ9
SUM OF ALL RESPONSES TO PHQ9 QUESTIONS 1 & 2: 0
SUM OF ALL RESPONSES TO PHQ QUESTIONS 1-9: 0
SUM OF ALL RESPONSES TO PHQ QUESTIONS 1-9: 0
2. FEELING DOWN, DEPRESSED OR HOPELESS: NOT AT ALL
SUM OF ALL RESPONSES TO PHQ QUESTIONS 1-9: 0
SUM OF ALL RESPONSES TO PHQ QUESTIONS 1-9: 0
1. LITTLE INTEREST OR PLEASURE IN DOING THINGS: NOT AT ALL

## 2024-06-20 NOTE — PROGRESS NOTES
\"Have you been to the ER, urgent care clinic since your last visit?  Hospitalized since your last visit?\"    NO    “Have you seen or consulted any other health care providers outside of [facility] since your last visit?”    NO     “Have you had a pap smear?”    NO    Date of last Cervical Cancer screen (HPV or PAP): 11/14/2016             Click Here for Release of Records Request

## 2024-06-21 NOTE — PROGRESS NOTES
2024    TELEHEALTH EVALUATION -- Audio/Visual    HPI:    Jaycee Mohan (:  1985) has requested an audio/video evaluation for the following concern(s):    Patient notes that there was some confusion about her medications.  She has anxiety and is on Wellbutrin and BuSpar.  She does not notice much of a significant decrease in symptoms.  With her anxiety.  Notes that she is dealing with a lot of stress in her life right now  Review of Systems  Anxiety  Prior to Visit Medications    Medication Sig Taking? Authorizing Provider   busPIRone (BUSPAR) 5 MG tablet Take 1 tablet by mouth 2 times daily Yes Griselda Strange MD   buPROPion (WELLBUTRIN XL) 150 MG extended release tablet Take 1 tablet by mouth every morning Yes Griselda Strange MD   traZODone (DESYREL) 100 MG tablet Take 1 tablet by mouth nightly as needed for Sleep Yes Griselda Strange MD   loratadine (CLARITIN) 10 MG tablet Take 1 tablet by mouth daily Yes Griselda Strange MD   Iron, Ferrous Sulfate, 325 (65 Fe) MG TABS Take 325 mg by mouth 2 times daily Yes ProviderMaricruz MD   levothyroxine (SYNTHROID) 125 MCG tablet TAKE 1 TABLET BY MOUTH ONCE DAILY IN THE MORNING ON AN EMPTY STOMACH FOR 90 DAYS Yes Griselda Strange MD   metFORMIN (GLUCOPHAGE-XR) 500 MG extended release tablet TAKE 1 TABLET BY MOUTH ONCE DAILY Yes rGiselda Strange MD       Social History     Tobacco Use    Smoking status: Never    Smokeless tobacco: Never   Vaping Use    Vaping Use: Never used   Substance Use Topics    Alcohol use: Never    Drug use: Never          PHYSICAL EXAMINATION:  Virtual  ASSESSMENT/PLAN:  #1.  Anxiety.    Increase her BuSpar from 5 to 10 mg twice a day.  Continue Wellbutrin.  Recommended therapist or counselor.      Jaycee Mohan, was evaluated through a synchronous (real-time) audio-video encounter. The patient (or guardian if applicable) is aware that this is a billable service, which includes applicable co-pays. This Virtual

## 2024-08-27 ENCOUNTER — TELEPHONE (OUTPATIENT)
Facility: CLINIC | Age: 39
End: 2024-08-27

## 2024-08-27 NOTE — TELEPHONE ENCOUNTER
Pt called in today in reference to her Tranzt message. Pt wants to know if she will need to come in and get the TB test or can she  an order to go and get it elsewhere. Please advise pt.

## 2024-08-28 ENCOUNTER — OFFICE VISIT (OUTPATIENT)
Facility: CLINIC | Age: 39
End: 2024-08-28
Payer: COMMERCIAL

## 2024-08-28 VITALS
RESPIRATION RATE: 18 BRPM | HEART RATE: 86 BPM | HEIGHT: 66 IN | WEIGHT: 255 LBS | OXYGEN SATURATION: 100 % | DIASTOLIC BLOOD PRESSURE: 74 MMHG | TEMPERATURE: 97.6 F | BODY MASS INDEX: 40.98 KG/M2 | SYSTOLIC BLOOD PRESSURE: 104 MMHG

## 2024-08-28 DIAGNOSIS — R76.11 NONSPECIFIC REACTION TO TUBERCULIN SKIN TEST: Primary | ICD-10-CM

## 2024-08-28 PROCEDURE — 99212 OFFICE O/P EST SF 10 MIN: CPT | Performed by: FAMILY MEDICINE

## 2024-08-28 NOTE — PROGRESS NOTES
\"Have you been to the ER, urgent care clinic since your last visit?  Hospitalized since your last visit?\"    NO    “Have you seen or consulted any other health care providers outside of [facility] since your last visit?”    NO            Click Here for Release of Records Request

## 2024-08-28 NOTE — PROGRESS NOTES
Jaycee Mohan is a 39 y.o. female who presents to the office today for the following:  Chief Complaint   Patient presents with    PPD Placement       No Known Allergies      Current Outpatient Medications:     busPIRone (BUSPAR) 5 MG tablet, Take 1 tablet by mouth 2 times daily, Disp: 180 tablet, Rfl: 1    buPROPion (WELLBUTRIN XL) 150 MG extended release tablet, Take 1 tablet by mouth every morning, Disp: 90 tablet, Rfl: 1    traZODone (DESYREL) 100 MG tablet, Take 1 tablet by mouth nightly as needed for Sleep, Disp: 30 tablet, Rfl: 5    loratadine (CLARITIN) 10 MG tablet, Take 1 tablet by mouth daily, Disp: 90 tablet, Rfl: 1    Iron, Ferrous Sulfate, 325 (65 Fe) MG TABS, Take 325 mg by mouth 2 times daily, Disp: , Rfl:     levothyroxine (SYNTHROID) 125 MCG tablet, TAKE 1 TABLET BY MOUTH ONCE DAILY IN THE MORNING ON AN EMPTY STOMACH FOR 90 DAYS, Disp: 90 tablet, Rfl: 1    metFORMIN (GLUCOPHAGE-XR) 500 MG extended release tablet, TAKE 1 TABLET BY MOUTH ONCE DAILY, Disp: 90 tablet, Rfl: 1      Past Medical History:   Diagnosis Date    [place]     Anxiety     Hypothyroidism     Insomnia        Past Surgical History:   Procedure Laterality Date    APPENDECTOMY         Social History     Socioeconomic History    Marital status:      Spouse name: Not on file    Number of children: Not on file    Years of education: Not on file    Highest education level: Not on file   Occupational History    Not on file   Tobacco Use    Smoking status: Never    Smokeless tobacco: Never   Vaping Use    Vaping status: Never Used   Substance and Sexual Activity    Alcohol use: Never    Drug use: Never    Sexual activity: Not on file   Other Topics Concern    Not on file   Social History Narrative    Not on file     Social Determinants of Health     Financial Resource Strain: Low Risk  (6/20/2024)    Overall Financial Resource Strain (CARDIA)     Difficulty of Paying Living Expenses: Not hard at all   Food Insecurity: No Food

## 2024-08-30 ENCOUNTER — OFFICE VISIT (OUTPATIENT)
Facility: CLINIC | Age: 39
End: 2024-08-30

## 2024-08-30 DIAGNOSIS — Z11.1 TUBERCULOSIS SCREENING: Primary | ICD-10-CM

## 2024-08-30 LAB
MM INDURATION, POC: 0 MM (ref 0–5)
PPD, POC: NEGATIVE

## 2024-10-22 DIAGNOSIS — E03.9 HYPOTHYROIDISM, UNSPECIFIED TYPE: ICD-10-CM

## 2024-10-22 NOTE — TELEPHONE ENCOUNTER
Last seen 8/30/2024   Last labs 3/19/2024  Last filled  3/19/2024  Next appointment 11/21/2024     Lab Results   Component Value Date     03/19/2024    K 4.4 03/19/2024     03/19/2024    CO2 21 03/19/2024    BUN 9 03/19/2024    CREATININE 0.62 03/19/2024    GLUCOSE 85 03/19/2024    CALCIUM 9.0 03/19/2024    BILITOT 0.3 03/19/2024    ALKPHOS 54 03/19/2024    AST 13 03/19/2024    ALT 12 03/19/2024    LABGLOM 117 03/19/2024    AGRATIO 1.6 03/19/2024

## 2024-10-24 RX ORDER — LEVOTHYROXINE SODIUM 125 UG/1
TABLET ORAL
Qty: 90 TABLET | Refills: 1 | Status: SHIPPED | OUTPATIENT
Start: 2024-10-24

## 2024-11-21 ENCOUNTER — OFFICE VISIT (OUTPATIENT)
Facility: CLINIC | Age: 39
End: 2024-11-21
Payer: COMMERCIAL

## 2024-11-21 VITALS
RESPIRATION RATE: 18 BRPM | WEIGHT: 258 LBS | HEIGHT: 66 IN | SYSTOLIC BLOOD PRESSURE: 134 MMHG | BODY MASS INDEX: 41.46 KG/M2 | HEART RATE: 90 BPM | TEMPERATURE: 97.7 F | OXYGEN SATURATION: 99 % | DIASTOLIC BLOOD PRESSURE: 79 MMHG

## 2024-11-21 DIAGNOSIS — F41.9 ANXIETY: ICD-10-CM

## 2024-11-21 DIAGNOSIS — R73.03 PREDIABETES: ICD-10-CM

## 2024-11-21 DIAGNOSIS — E03.9 HYPOTHYROIDISM, UNSPECIFIED TYPE: ICD-10-CM

## 2024-11-21 DIAGNOSIS — D50.9 IRON DEFICIENCY ANEMIA, UNSPECIFIED IRON DEFICIENCY ANEMIA TYPE: Primary | ICD-10-CM

## 2024-11-21 DIAGNOSIS — J30.1 SEASONAL ALLERGIC RHINITIS DUE TO POLLEN: ICD-10-CM

## 2024-11-21 LAB — HBA1C MFR BLD: 5.7 %

## 2024-11-21 PROCEDURE — G8428 CUR MEDS NOT DOCUMENT: HCPCS | Performed by: FAMILY MEDICINE

## 2024-11-21 PROCEDURE — G8417 CALC BMI ABV UP PARAM F/U: HCPCS | Performed by: FAMILY MEDICINE

## 2024-11-21 PROCEDURE — 99214 OFFICE O/P EST MOD 30 MIN: CPT | Performed by: FAMILY MEDICINE

## 2024-11-21 PROCEDURE — 1036F TOBACCO NON-USER: CPT | Performed by: FAMILY MEDICINE

## 2024-11-21 PROCEDURE — 83036 HEMOGLOBIN GLYCOSYLATED A1C: CPT | Performed by: FAMILY MEDICINE

## 2024-11-21 PROCEDURE — G8484 FLU IMMUNIZE NO ADMIN: HCPCS | Performed by: FAMILY MEDICINE

## 2024-11-21 RX ORDER — BUSPIRONE HYDROCHLORIDE 5 MG/1
5 TABLET ORAL 2 TIMES DAILY
Qty: 180 TABLET | Refills: 1 | Status: SHIPPED | OUTPATIENT
Start: 2024-11-21 | End: 2025-05-20

## 2024-11-21 RX ORDER — METFORMIN HYDROCHLORIDE 500 MG/1
TABLET, EXTENDED RELEASE ORAL
Qty: 90 TABLET | Refills: 1 | Status: SHIPPED | OUTPATIENT
Start: 2024-11-21

## 2024-11-21 RX ORDER — BUPROPION HYDROCHLORIDE 150 MG/1
150 TABLET ORAL EVERY MORNING
Qty: 90 TABLET | Refills: 1 | Status: SHIPPED | OUTPATIENT
Start: 2024-11-21

## 2024-11-21 RX ORDER — LORATADINE 10 MG/1
10 TABLET ORAL DAILY
Qty: 90 TABLET | Refills: 1 | Status: SHIPPED | OUTPATIENT
Start: 2024-11-21

## 2024-11-21 RX ORDER — LEVOTHYROXINE SODIUM 125 UG/1
TABLET ORAL
Qty: 90 TABLET | Refills: 1 | Status: SHIPPED | OUTPATIENT
Start: 2024-11-21

## 2024-11-21 SDOH — ECONOMIC STABILITY: FOOD INSECURITY: WITHIN THE PAST 12 MONTHS, YOU WORRIED THAT YOUR FOOD WOULD RUN OUT BEFORE YOU GOT MONEY TO BUY MORE.: NEVER TRUE

## 2024-11-21 SDOH — ECONOMIC STABILITY: INCOME INSECURITY: HOW HARD IS IT FOR YOU TO PAY FOR THE VERY BASICS LIKE FOOD, HOUSING, MEDICAL CARE, AND HEATING?: NOT VERY HARD

## 2024-11-21 SDOH — ECONOMIC STABILITY: FOOD INSECURITY: WITHIN THE PAST 12 MONTHS, THE FOOD YOU BOUGHT JUST DIDN'T LAST AND YOU DIDN'T HAVE MONEY TO GET MORE.: NEVER TRUE

## 2024-11-21 ASSESSMENT — PATIENT HEALTH QUESTIONNAIRE - PHQ9
SUM OF ALL RESPONSES TO PHQ QUESTIONS 1-9: 0
2. FEELING DOWN, DEPRESSED OR HOPELESS: NOT AT ALL
SUM OF ALL RESPONSES TO PHQ QUESTIONS 1-9: 0
1. LITTLE INTEREST OR PLEASURE IN DOING THINGS: NOT AT ALL
SUM OF ALL RESPONSES TO PHQ QUESTIONS 1-9: 0
SUM OF ALL RESPONSES TO PHQ9 QUESTIONS 1 & 2: 0
SUM OF ALL RESPONSES TO PHQ QUESTIONS 1-9: 0

## 2024-11-21 NOTE — PROGRESS NOTES
Jaycee Mohan is a 39 y.o. female who presents to the office today for the following:  Chief Complaint   Patient presents with    Diabetes     prediabetes    Anxiety    Depression    Thyroid Problem       No Known Allergies      Current Outpatient Medications:     buPROPion (WELLBUTRIN XL) 150 MG extended release tablet, Take 1 tablet by mouth every morning, Disp: 90 tablet, Rfl: 1    busPIRone (BUSPAR) 5 MG tablet, Take 1 tablet by mouth 2 times daily, Disp: 180 tablet, Rfl: 1    levothyroxine (SYNTHROID) 125 MCG tablet, TAKE 1 TABLET BY MOUTH ONCE DAILY IN THE MORNING ON AN EMPTY STOMACH, Disp: 90 tablet, Rfl: 1    loratadine (CLARITIN) 10 MG tablet, Take 1 tablet by mouth daily, Disp: 90 tablet, Rfl: 1    metFORMIN (GLUCOPHAGE-XR) 500 MG extended release tablet, TAKE 1 TABLET BY MOUTH ONCE DAILY, Disp: 90 tablet, Rfl: 1    Iron, Ferrous Sulfate, 325 (65 Fe) MG TABS, Take 325 mg by mouth 2 times daily, Disp: , Rfl:     traZODone (DESYREL) 100 MG tablet, Take 1 tablet by mouth nightly as needed for Sleep (Patient not taking: Reported on 11/21/2024), Disp: 30 tablet, Rfl: 5      Past Medical History:   Diagnosis Date    [place]     Anxiety     Hypothyroidism     Insomnia        Past Surgical History:   Procedure Laterality Date    APPENDECTOMY         Social History     Socioeconomic History    Marital status:      Spouse name: Not on file    Number of children: Not on file    Years of education: Not on file    Highest education level: Not on file   Occupational History    Not on file   Tobacco Use    Smoking status: Never    Smokeless tobacco: Never   Vaping Use    Vaping status: Never Used   Substance and Sexual Activity    Alcohol use: Never    Drug use: Never    Sexual activity: Not on file   Other Topics Concern    Not on file   Social History Narrative    Not on file     Social Determinants of Health     Financial Resource Strain: Low Risk  (11/21/2024)    Overall Financial Resource Strain (CARDIA)

## 2025-04-27 DIAGNOSIS — G47.00 INSOMNIA, UNSPECIFIED TYPE: ICD-10-CM

## 2025-04-28 RX ORDER — TRAZODONE HYDROCHLORIDE 50 MG/1
TABLET ORAL
Qty: 90 TABLET | Refills: 0 | OUTPATIENT
Start: 2025-04-28